# Patient Record
Sex: FEMALE | Race: ASIAN
[De-identification: names, ages, dates, MRNs, and addresses within clinical notes are randomized per-mention and may not be internally consistent; named-entity substitution may affect disease eponyms.]

---

## 2018-02-04 ENCOUNTER — HOSPITAL ENCOUNTER (EMERGENCY)
Dept: HOSPITAL 25 - UCEAST | Age: 53
Discharge: HOME | End: 2018-02-04
Payer: COMMERCIAL

## 2018-02-04 VITALS — SYSTOLIC BLOOD PRESSURE: 126 MMHG | DIASTOLIC BLOOD PRESSURE: 89 MMHG
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
## 2018-02-04 DIAGNOSIS — S93.401A: Primary | ICD-10-CM
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
## 2018-02-04 DIAGNOSIS — Z88.1: ICD-10-CM

## 2018-02-04 DIAGNOSIS — Z91.013: ICD-10-CM

## 2018-02-04 DIAGNOSIS — Z88.2: ICD-10-CM

## 2018-02-04 PROCEDURE — 99212 OFFICE O/P EST SF 10 MIN: CPT

## 2018-02-04 PROCEDURE — G0463 HOSPITAL OUTPT CLINIC VISIT: HCPCS

## 2018-02-04 NOTE — UC
Raul CHAVARRIA Stephanie, scribed for Terence Charles MD on 02/04/18 at 1646 .





Lower Extremity/Ankle HPI





- HPI Summary


HPI Summary: 


The pt is a 53 y/o F presenting to  with c/o bilateral ankle pain that began 

at 13:30 today s/p falling down the stairs. The pt reports she has been able to 

ambulate but it is painful. 








- History of Current Complaint


Chief Complaint: UCLowerExtremity


Stated Complaint: ANKLE INJURY


Time Seen by Provider: 02/04/18 16:31


Hx Obtained From: Patient


Pregnant?: No


Onset/Duration: Sudden Onset, Lasting Hours - 3, Still Present


Severity Currently: Moderate


Pain Intensity: 5


Pain Scale Used: 0-10 Numeric


Aggravating Factor(s): Ambulation


Alleviating Factor(s): Rest


Able to Bear Weight: Yes





- Allergies/Home Medications


Allergies/Adverse Reactions: 


 Allergies











Allergy/AdvReac Type Severity Reaction Status Date / Time


 


MS Shellfish Allergy Allergy Intermediate Rash Verified 02/04/18 16:25





[Shellfish Allergy]     


 


MS Cephalexin [From Keflex] Allergy  Difficulty Verified 02/04/18 16:25





   Breathing  


 


MS Sulfamethoxazole Allergy  Hives/Diff. Verified 02/04/18 16:25





w/Trimethoprim   Breathing/I  





[From Bactrim]   tching  


 


several antibiotics Allergy  Hives/Diff. Uncoded 02/04/18 16:25





   Breathing/I  





   tching  











Home Medications: 


 Home Medications





NK [No Home Medications Reported]  02/04/18 [History Confirmed 02/04/18]











PMH/Surg Hx/FS Hx/Imm Hx


Previously Healthy: Yes - The pt denies a past medical hx.





- Surgical History


Surgical History: Yes


Surgery Procedure, Year, and Place: left elbow SURGERY PINS THEN REMOVED PINS 

2003





- Family History


Known Family History: Positive: Unknown - The pt denies all fhx.





- Social History


Occupation: Employed Full-time


Lives: With Family


Alcohol Use: Rare


Alcohol Amount: 1/2 glass wine


Substance Use Type: None


Smoking Status (MU): Never Smoked Tobacco





Review of Systems


Constitutional: Negative


Skin: Negative


Eyes: Negative


ENT: Negative


Respiratory: Negative


Cardiovascular: Negative


Gastrointestinal: Negative


Genitourinary: Negative


Motor: Negative


Neurovascular: Negative


Musculoskeletal: Other: - bilateral ankle pain


Neurological: Negative


Psychological: Negative


All Other Systems Reviewed And Are Negative: Yes





Physical Exam


Triage Information Reviewed: Yes


Vital Signs: 


 Initial Vital Signs











Temp  98.6 F   02/04/18 16:17


 


Pulse  70   02/04/18 16:17


 


Resp  18   02/04/18 16:17


 


BP  126/89   02/04/18 16:17


 


Pulse Ox  100   02/04/18 16:17











Vital Signs Reviewed: Yes





- Additional Comments


General: well-appearing, no pain distress


Skin: warm, color reflects adequate perfusion, dry


Head: normal


Eyes: EOMI, [place]


ENT: normal


Neck: supple, nontender


Respiratory: CTA, breath sounds present


Cardiovascular: RRR


Abdomen: soft, nontender


Bowel: present


Musculoskeletal: strength/ROM intact, swelling at the distal fibula bilaterally

, L minimal ankle tenderness, R moderate ankle swelling and tender to palpation


Neurological: normal, sensory/motor intact, A&O x3


Psychological: affect/mood appropriate











Diagnostics





- Radiology


  ** Ankle X-ray


Xray Interpretation: Positive (See Comments)


Radiology Interpretation Completed By: Radiologist - THERE IS MILD SOFT TISSUE 

SWELLING OVERLYING THE FIBULAR MALLEOLUS AND QUESTIONABLE BONY AVULSION INJURY 

AT THE DISTAL MOST FIBULA AS DESCRIBED ABOVE. If the patient's symptoms persist

, follow-up imaging is recommended.





Lower Extremity Course/Dx





- Course


Course Of Treatment: BP noted and advised to follow up with PCP. Medications 

reviewed.  DISCUSSED RESULTS OF X-RAYS WITH PATIENT.





- Differential Dx/Diagnosis


Provider Diagnoses: RIGHT ANKLE SPRAIN





Discharge





- Discharge Plan


Condition: Stable


Disposition: HOME


Patient Education Materials:  Ankle Sprain (ED)


Referrals: 


Shy Henderson MD [Primary Care Provider] - 


Additional Instructions: 


FOLLOW UP WITH YOUR DOCTOR IF NOT COMPLETELY IMPROVED.


GET RECHECKED FOR ANY WORSENING OF YOUR CONDITION OR QUESTIONS OR CONCERNS.


Your blood pressure was elevated during todays visit; please follow up with 

your primary care provider within a week for further evaluation.





The documentation as recorded by the Raul moulton Stephanie accurately reflects 

the service I personally performed and the decisions made by me, Terence Charles MD.

## 2018-02-04 NOTE — RAD
INDICATION:  Right ankle pain and swelling after a slip and fall injury



COMPARISON: None.



TECHNIQUE: 3 views of the right ankle were obtained.



FINDINGS: There is a mild degree of soft tissue swelling overlying the fibular malleolus

relative to the medial malleolus. On the AP view of the ankle there is faint bony density

immediately inferior to the fibular malleolus. No other otherwise intact and appropriately

aligned.



IMPRESSION:  THERE IS MILD SOFT TISSUE SWELLING OVERLYING THE FIBULAR MALLEOLUS AND

QUESTIONABLE BONY AVULSION INJURY AT THE DISTAL MOST FIBULA AS DESCRIBED ABOVE.



If the patient's symptoms persist, follow-up imaging is recommended.

## 2019-06-19 ENCOUNTER — HOSPITAL ENCOUNTER (EMERGENCY)
Dept: HOSPITAL 25 - UCEAST | Age: 54
Discharge: HOME | End: 2019-06-19
Payer: COMMERCIAL

## 2019-06-19 VITALS — DIASTOLIC BLOOD PRESSURE: 75 MMHG | SYSTOLIC BLOOD PRESSURE: 99 MMHG

## 2019-06-19 DIAGNOSIS — S60.111A: Primary | ICD-10-CM
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
## 2019-06-19 DIAGNOSIS — Z88.1: ICD-10-CM
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
## 2019-06-19 PROCEDURE — G0463 HOSPITAL OUTPT CLINIC VISIT: HCPCS

## 2019-06-19 PROCEDURE — 99211 OFF/OP EST MAY X REQ PHY/QHP: CPT

## 2019-06-19 PROCEDURE — 11740 EVACUATION SUBUNGUAL HMTMA: CPT

## 2019-06-19 NOTE — UC
Hand/Wrist HPI





- HPI Summary


HPI Summary: 





Pt c/o right thumb pain and swelling after getting thumb caught in door this 

morning. Pt has subungual hematoma. 





- History Of Current Complaint


Chief Complaint: UCUpperExtremity


Stated Complaint: RT THUMB INJURY


Time Seen by Provider: 06/19/19 19:44


Hx Obtained From: Patient


Pregnant?: No


Onset/Duration: Sudden Onset, Still Present, Worse Since - onset


Severity Initially: Moderate


Severity Currently: Moderate


Pain Intensity: 5


Character Of Pain: Dull, Aching, Throbbing


Aggravating Factor(s): Movement


Alleviating Factor(s): Nothing


Associated Signs And Symptoms: Positive: Swelling, Bruising


Related History: Dominant Hand Right





- Risk Factors


Compartment Syndrome Risk Factors: Pain





- Allergies/Home Medications


Allergies/Adverse Reactions: 


 Allergies











Allergy/AdvReac Type Severity Reaction Status Date / Time


 


MS Shellfish Allergy Allergy Intermediate Rash Verified 02/04/18 16:25





[Shellfish Allergy]     


 


cephalexin Allergy  Hives/Diff. Verified 06/19/19 19:37





   Breathing/I  





   tching  


 


MS Cephalexin [From Keflex] Allergy  Difficulty Verified 02/04/18 16:25





   Breathing  


 


MS Sulfamethoxazole Allergy  Hives/Diff. Verified 02/04/18 16:25





w/Trimethoprim   Breathing/I  





[From Bactrim]   tching  


 


shellfish derived Allergy  Rash Verified 06/19/19 19:37


 


sulfamethoxazole Allergy  Hives/Diff. Verified 06/19/19 19:37





[From Bactrim]   Breathing/I  





   tching  


 


trimethoprim [From Bactrim] Allergy  Hives/Diff. Verified 06/19/19 19:37





   Breathing/I  





   tching  


 


several antibiotics Allergy  Hives/Diff. Uncoded 02/04/18 16:25





   Breathing/I  





   tching  














PMH/Surg Hx/FS Hx/Imm Hx


Previously Healthy: Yes





- Surgical History


Surgical History: Yes


Surgery Procedure, Year, and Place: left elbow SURGERY PINS THEN REMOVED PINS 

2003





- Family History


Known Family History: Positive: Unknown - The pt denies all fhx.





- Social History


Occupation: Employed Full-time


Lives: With Family


Alcohol Use: Rare


Alcohol Amount: 1/2 glass wine


Substance Use Type: None


Smoking Status (MU): Never Smoked Tobacco


Have You Smoked in the Last Year: No





- Immunization History


Vaccination Up to Date: Yes





Review of Systems


All Other Systems Reviewed And Are Negative: Yes


Constitutional: Positive: Negative


Skin: Positive: Bruising - right thumb under nail


Eyes: Positive: Negative


ENT: Positive: Negative


Respiratory: Positive: Negative


Cardiovascular: Positive: Negative


Gastrointestinal: Positive: Negative


Genitourinary: Positive: Negative


Motor: Positive: Negative


Neurovascular: Positive: Negative


Musculoskeletal: Positive: Edema - right distal thumb


Neurological: Positive: Negative


Psychological: Positive: Negative


Is Patient Immunocompromised?: No





Physical Exam


Triage Information Reviewed: Yes


Appearance: Well-Appearing


Vital Signs: 


 Initial Vital Signs











Temp  98.8 F   06/19/19 19:37


 


Pulse  83   06/19/19 19:37


 


Resp  18   06/19/19 19:37


 


BP  99/75   06/19/19 19:37


 


Pulse Ox  97   06/19/19 19:37











Vital Signs Reviewed: Yes


Eye Exam: Normal


ENT Exam: Normal


Dental Exam: Normal


Neck exam: Normal


Respiratory: Positive: No respiratory distress


Musculoskeletal: Positive: Edema @ - right distal thumb


Neurological Exam: Normal


Psychological Exam: Normal


Skin Exam: Other - right thumb, subungual hematoma, drained with cautery, pt 

verbalized instant imporovment in pain and small amount of blood drained from 

trephination





Hand/Wrist Course/Dx





- Differential Dx/Diagnosis


Differential Diagnosis/HQI/PQRI: Subungual Hematoma


Provider Diagnosis: 


 Hematoma, subungual, thumb, right








Discharge





- Sign-Out/Discharge


Documenting (check all that apply): Patient Departure


All imaging exams completed and their final reports reviewed: No Studies





- Discharge Plan


Condition: Stable


Disposition: HOME


Patient Education Materials:  Subungual Hematoma (ED)


Referrals: 


Shy Henderson MD [Primary Care Provider] - If Needed





- Billing Disposition and Condition


Condition: STABLE


Disposition: Home